# Patient Record
Sex: MALE | Race: WHITE | NOT HISPANIC OR LATINO | URBAN - METROPOLITAN AREA
[De-identification: names, ages, dates, MRNs, and addresses within clinical notes are randomized per-mention and may not be internally consistent; named-entity substitution may affect disease eponyms.]

---

## 2017-11-06 ENCOUNTER — INPATIENT (INPATIENT)
Facility: HOSPITAL | Age: 27
LOS: 0 days | Discharge: HOME | DRG: 728 | End: 2017-11-07
Attending: UROLOGY | Admitting: UROLOGY
Payer: SELF-PAY

## 2017-11-06 VITALS
RESPIRATION RATE: 18 BRPM | SYSTOLIC BLOOD PRESSURE: 120 MMHG | DIASTOLIC BLOOD PRESSURE: 77 MMHG | HEART RATE: 100 BPM | OXYGEN SATURATION: 99 % | TEMPERATURE: 98 F

## 2017-11-06 LAB
ALBUMIN SERPL ELPH-MCNC: 3.5 G/DL — SIGNIFICANT CHANGE UP (ref 3.4–5)
ALP SERPL-CCNC: 109 U/L — SIGNIFICANT CHANGE UP (ref 40–120)
ALT FLD-CCNC: 28 U/L — SIGNIFICANT CHANGE UP (ref 12–42)
ANION GAP SERPL CALC-SCNC: 9 MMOL/L — SIGNIFICANT CHANGE UP (ref 9–16)
APPEARANCE UR: CLEAR — SIGNIFICANT CHANGE UP
AST SERPL-CCNC: 15 U/L — SIGNIFICANT CHANGE UP (ref 15–37)
BILIRUB SERPL-MCNC: 1 MG/DL — SIGNIFICANT CHANGE UP (ref 0.2–1.2)
BILIRUB UR-MCNC: NEGATIVE — SIGNIFICANT CHANGE UP
BUN SERPL-MCNC: 13 MG/DL — SIGNIFICANT CHANGE UP (ref 7–23)
CALCIUM SERPL-MCNC: 10 MG/DL — SIGNIFICANT CHANGE UP (ref 8.5–10.5)
CHLORIDE SERPL-SCNC: 97 MMOL/L — SIGNIFICANT CHANGE UP (ref 96–108)
CO2 SERPL-SCNC: 27 MMOL/L — SIGNIFICANT CHANGE UP (ref 22–31)
COLOR SPEC: YELLOW — SIGNIFICANT CHANGE UP
CREAT SERPL-MCNC: 0.83 MG/DL — SIGNIFICANT CHANGE UP (ref 0.5–1.3)
DIFF PNL FLD: (no result)
GLUCOSE SERPL-MCNC: 98 MG/DL — SIGNIFICANT CHANGE UP (ref 70–99)
GLUCOSE UR QL: NEGATIVE — SIGNIFICANT CHANGE UP
HCT VFR BLD CALC: 46 % — SIGNIFICANT CHANGE UP (ref 39–50)
HGB BLD-MCNC: 15.4 G/DL — SIGNIFICANT CHANGE UP (ref 13–17)
KETONES UR-MCNC: (no result) MG/DL
LACTATE SERPL-SCNC: 1 MMOL/L — SIGNIFICANT CHANGE UP (ref 0.4–2)
LEUKOCYTE ESTERASE UR-ACNC: NEGATIVE — SIGNIFICANT CHANGE UP
LYMPHOCYTES # BLD AUTO: 6 % — LOW (ref 13–44)
MCHC RBC-ENTMCNC: 30.5 PG — SIGNIFICANT CHANGE UP (ref 27–34)
MCHC RBC-ENTMCNC: 33.5 G/DL — SIGNIFICANT CHANGE UP (ref 32–36)
MCV RBC AUTO: 91.1 FL — SIGNIFICANT CHANGE UP (ref 80–100)
MONOCYTES NFR BLD AUTO: 12 % — SIGNIFICANT CHANGE UP (ref 2–14)
NEUTROPHILS NFR BLD AUTO: 74 % — SIGNIFICANT CHANGE UP (ref 43–77)
NITRITE UR-MCNC: NEGATIVE — SIGNIFICANT CHANGE UP
PH UR: 6 — SIGNIFICANT CHANGE UP (ref 5–8)
PLATELET # BLD AUTO: 265 K/UL — SIGNIFICANT CHANGE UP (ref 150–400)
POTASSIUM SERPL-MCNC: 3.8 MMOL/L — SIGNIFICANT CHANGE UP (ref 3.5–5.3)
POTASSIUM SERPL-SCNC: 3.8 MMOL/L — SIGNIFICANT CHANGE UP (ref 3.5–5.3)
PROT SERPL-MCNC: 8.7 G/DL — HIGH (ref 6.4–8.2)
PROT UR-MCNC: NEGATIVE MG/DL — SIGNIFICANT CHANGE UP
RBC # BLD: 5.05 M/UL — SIGNIFICANT CHANGE UP (ref 4.2–5.8)
RBC # FLD: 12.3 % — SIGNIFICANT CHANGE UP (ref 10.3–16.9)
SODIUM SERPL-SCNC: 133 MMOL/L — SIGNIFICANT CHANGE UP (ref 132–145)
SP GR SPEC: 1.01 — SIGNIFICANT CHANGE UP (ref 1–1.03)
UROBILINOGEN FLD QL: 0.2 E.U./DL — SIGNIFICANT CHANGE UP
WBC # BLD: 31.3 K/UL — HIGH (ref 3.8–10.5)
WBC # FLD AUTO: 31.3 K/UL — HIGH (ref 3.8–10.5)

## 2017-11-06 PROCEDURE — 99285 EMERGENCY DEPT VISIT HI MDM: CPT

## 2017-11-06 PROCEDURE — 76870 US EXAM SCROTUM: CPT | Mod: 26

## 2017-11-06 RX ORDER — CIPROFLOXACIN LACTATE 400MG/40ML
400 VIAL (ML) INTRAVENOUS EVERY 12 HOURS
Qty: 0 | Refills: 0 | Status: DISCONTINUED | OUTPATIENT
Start: 2017-11-06 | End: 2017-11-06

## 2017-11-06 RX ORDER — PIPERACILLIN AND TAZOBACTAM 4; .5 G/20ML; G/20ML
3.38 INJECTION, POWDER, LYOPHILIZED, FOR SOLUTION INTRAVENOUS ONCE
Qty: 0 | Refills: 0 | Status: COMPLETED | OUTPATIENT
Start: 2017-11-06 | End: 2017-11-06

## 2017-11-06 RX ORDER — OXYCODONE AND ACETAMINOPHEN 5; 325 MG/1; MG/1
2 TABLET ORAL EVERY 4 HOURS
Qty: 0 | Refills: 0 | Status: DISCONTINUED | OUTPATIENT
Start: 2017-11-06 | End: 2017-11-07

## 2017-11-06 RX ORDER — MORPHINE SULFATE 50 MG/1
4 CAPSULE, EXTENDED RELEASE ORAL EVERY 4 HOURS
Qty: 0 | Refills: 0 | Status: DISCONTINUED | OUTPATIENT
Start: 2017-11-06 | End: 2017-11-07

## 2017-11-06 RX ORDER — MORPHINE SULFATE 50 MG/1
4 CAPSULE, EXTENDED RELEASE ORAL ONCE
Qty: 0 | Refills: 0 | Status: DISCONTINUED | OUTPATIENT
Start: 2017-11-06 | End: 2017-11-06

## 2017-11-06 RX ORDER — PIPERACILLIN AND TAZOBACTAM 4; .5 G/20ML; G/20ML
3.38 INJECTION, POWDER, LYOPHILIZED, FOR SOLUTION INTRAVENOUS EVERY 6 HOURS
Qty: 0 | Refills: 0 | Status: DISCONTINUED | OUTPATIENT
Start: 2017-11-06 | End: 2017-11-07

## 2017-11-06 RX ORDER — SODIUM CHLORIDE 9 MG/ML
2000 INJECTION INTRAMUSCULAR; INTRAVENOUS; SUBCUTANEOUS ONCE
Qty: 0 | Refills: 0 | Status: COMPLETED | OUTPATIENT
Start: 2017-11-06 | End: 2017-11-06

## 2017-11-06 RX ORDER — IBUPROFEN 200 MG
400 TABLET ORAL EVERY 6 HOURS
Qty: 0 | Refills: 0 | Status: DISCONTINUED | OUTPATIENT
Start: 2017-11-06 | End: 2017-11-07

## 2017-11-06 RX ORDER — ACETAMINOPHEN 500 MG
650 TABLET ORAL EVERY 6 HOURS
Qty: 0 | Refills: 0 | Status: DISCONTINUED | OUTPATIENT
Start: 2017-11-06 | End: 2017-11-07

## 2017-11-06 RX ORDER — OXYCODONE AND ACETAMINOPHEN 5; 325 MG/1; MG/1
1 TABLET ORAL EVERY 4 HOURS
Qty: 0 | Refills: 0 | Status: DISCONTINUED | OUTPATIENT
Start: 2017-11-06 | End: 2017-11-07

## 2017-11-06 RX ORDER — CEFTRIAXONE 500 MG/1
1 INJECTION, POWDER, FOR SOLUTION INTRAMUSCULAR; INTRAVENOUS ONCE
Qty: 0 | Refills: 0 | Status: COMPLETED | OUTPATIENT
Start: 2017-11-06 | End: 2017-11-06

## 2017-11-06 RX ORDER — AZITHROMYCIN 500 MG/1
500 TABLET, FILM COATED ORAL ONCE
Qty: 0 | Refills: 0 | Status: COMPLETED | OUTPATIENT
Start: 2017-11-06 | End: 2017-11-06

## 2017-11-06 RX ADMIN — SODIUM CHLORIDE 1000 MILLILITER(S): 9 INJECTION INTRAMUSCULAR; INTRAVENOUS; SUBCUTANEOUS at 20:53

## 2017-11-06 RX ADMIN — Medication 200 MILLIGRAM(S): at 20:52

## 2017-11-06 RX ADMIN — MORPHINE SULFATE 4 MILLIGRAM(S): 50 CAPSULE, EXTENDED RELEASE ORAL at 17:23

## 2017-11-06 RX ADMIN — CEFTRIAXONE 100 GRAM(S): 500 INJECTION, POWDER, FOR SOLUTION INTRAMUSCULAR; INTRAVENOUS at 20:02

## 2017-11-06 RX ADMIN — MORPHINE SULFATE 4 MILLIGRAM(S): 50 CAPSULE, EXTENDED RELEASE ORAL at 19:53

## 2017-11-06 NOTE — H&P ADULT - HISTORY OF PRESENT ILLNESS
26 yo male with no significant PMHx was admitted for acute L epididymo orchitis. 28 yo male with no significant PMHx was admitted for acute L epididymo orchitis. Patient noted swelling and discomfort L testicle 2 days ago. No fever/chills. No penile d/c. No h/o STD's.

## 2017-11-06 NOTE — H&P ADULT - NSHPLABSRESULTS_GEN_ALL_CORE
15.4   31.3  )-----------( 265      ( 2017 18:17 )             46.0   11-    133  |  97  |  13  ----------------------------<  98  3.8   |  27  |  0.83    Ca    10.0      2017 18:17    TPro  8.7<H>  /  Alb  3.5  /  TBili  1.0  /  DBili  x   /  AST  15  /  ALT  28  /  AlkPhos  109  11-    Urinalysis Basic - ( 2017 18:17 )    Color: Yellow / Appearance: Clear / S.010 / pH: x  Gluc: x / Ketone: Trace mg/dL  / Bili: NEGATIVE / Urobili: 0.2 E.U./dL   Blood: x / Protein: NEGATIVE mg/dL / Nitrite: NEGATIVE   Leuk Esterase: NEGATIVE / RBC: < 5 /HPF / WBC < 5 /HPF   Sq Epi: x / Non Sq Epi: x / Bacteria: Present /HPF    < from: US Testicles (17 @ 18:35) >    MPRESSION: Acute left epididymo-orchitis.      < end of copied text >

## 2017-11-06 NOTE — ED PROVIDER NOTE - ABDOMINAL EXAM
supra-pubic pain, pelvic lymphadenopathy supra-pubic pain, (+) enlarged L testicle > R testicle, mild erythema (+) palpable hydrocele, L testicle tender to palpation, no scrotal swelling or induration, testicle has normal lie, normal cremasteric reflex, (+) pelvic lymphadenopathy

## 2017-11-06 NOTE — ED PROVIDER NOTE - PROGRESS NOTE DETAILS
JW Pain controlled with morphine.  PT has orchitis with pyocele.  PT TX with IV antibiotics for possible GC/chlam infection and E coli infection.  Urology consult to discuss transfer.  Treat symptomatically and reassess. JW Discussed with urology Pt accepted as direct transfer. FINA Discussed with urology Pt accepted as a transfer for direct admission to Dr. Moses.  Discussed with urology resident and transfer center.  Pt treated with antibiotics and comfortable on morphine.  At time of transfer Pt hemodynamically stable in NAD.  Lactate of 1.

## 2017-11-06 NOTE — ED PROVIDER NOTE - PHYSICAL EXAMINATION
JW Testicular exam performed with RN Myerson in room.  Left testicular edema, erythema, TTP.  Cremasteric reflex absent.

## 2017-11-06 NOTE — H&P ADULT - NSHPPHYSICALEXAM_GEN_ALL_CORE
Gen : alert and awake  Abd:soft,NT,ND  : circumcised, no penile d/c, bilat testes descended, + swelling and discomfort L testicle.

## 2017-11-06 NOTE — ED PROVIDER NOTE - OBJECTIVE STATEMENT
27 YOM no PMH p/w 2 days of left testicular swelling.  Sx sudden onset at rest worsening over the past two days.  Associated pain, redness, and pelvic swelling.  No fevers, chills, sweats, weight loss, fatigue, or malaise.  No discharge, dysuria, other Sx. 4 sexual partners in the past year, protection some of the time.  No trauma.  No history of this in the past.

## 2017-11-06 NOTE — ED PROVIDER NOTE - ATTENDING CONTRIBUTION TO CARE
27 y.o. male with L epididymo-orchitis, with US showing hydrocele/pyocele, mild scrotal erythema and inguinal lymphadenopathy.  IV abx started in the ER as per urology, accepted initially for ER - ER transfer, then urology accepted for admission.

## 2017-11-06 NOTE — ED PROVIDER NOTE - MEDICAL DECISION MAKING DETAILS
27 YOM no PMH p/w 2 days of left testicular swelling.  Tachycardia, no fever.  DDX torsion vs infection.  R/O torsion, evaluate for infection, treat according to CDC recommendation.  Treat symptomatically for pain, reassess.

## 2017-11-07 VITALS
OXYGEN SATURATION: 98 % | SYSTOLIC BLOOD PRESSURE: 99 MMHG | DIASTOLIC BLOOD PRESSURE: 67 MMHG | HEART RATE: 75 BPM | RESPIRATION RATE: 16 BRPM | TEMPERATURE: 97 F

## 2017-11-07 DIAGNOSIS — N45.3 EPIDIDYMO-ORCHITIS: ICD-10-CM

## 2017-11-07 LAB
ANION GAP SERPL CALC-SCNC: 12 MMOL/L — SIGNIFICANT CHANGE UP (ref 5–17)
BUN SERPL-MCNC: 13 MG/DL — SIGNIFICANT CHANGE UP (ref 7–23)
CALCIUM SERPL-MCNC: 9.5 MG/DL — SIGNIFICANT CHANGE UP (ref 8.4–10.5)
CHLORIDE SERPL-SCNC: 98 MMOL/L — SIGNIFICANT CHANGE UP (ref 96–108)
CO2 SERPL-SCNC: 26 MMOL/L — SIGNIFICANT CHANGE UP (ref 22–31)
CREAT SERPL-MCNC: 0.67 MG/DL — SIGNIFICANT CHANGE UP (ref 0.5–1.3)
CULTURE RESULTS: NO GROWTH — SIGNIFICANT CHANGE UP
GLUCOSE SERPL-MCNC: 95 MG/DL — SIGNIFICANT CHANGE UP (ref 70–99)
HCT VFR BLD CALC: 39.3 % — SIGNIFICANT CHANGE UP (ref 39–50)
HCT VFR BLD CALC: 43.1 % — SIGNIFICANT CHANGE UP (ref 39–50)
HGB BLD-MCNC: 12.8 G/DL — LOW (ref 13–17)
HGB BLD-MCNC: 14.3 G/DL — SIGNIFICANT CHANGE UP (ref 13–17)
MAGNESIUM SERPL-MCNC: 2.3 MG/DL — SIGNIFICANT CHANGE UP (ref 1.6–2.6)
MCHC RBC-ENTMCNC: 29.8 PG — SIGNIFICANT CHANGE UP (ref 27–34)
MCHC RBC-ENTMCNC: 30.6 PG — SIGNIFICANT CHANGE UP (ref 27–34)
MCHC RBC-ENTMCNC: 32.6 G/DL — SIGNIFICANT CHANGE UP (ref 32–36)
MCHC RBC-ENTMCNC: 33.2 G/DL — SIGNIFICANT CHANGE UP (ref 32–36)
MCV RBC AUTO: 91.6 FL — SIGNIFICANT CHANGE UP (ref 80–100)
MCV RBC AUTO: 92.1 FL — SIGNIFICANT CHANGE UP (ref 80–100)
PHOSPHATE SERPL-MCNC: 2.6 MG/DL — SIGNIFICANT CHANGE UP (ref 2.5–4.5)
PLATELET # BLD AUTO: 238 K/UL — SIGNIFICANT CHANGE UP (ref 150–400)
PLATELET # BLD AUTO: 247 K/UL — SIGNIFICANT CHANGE UP (ref 150–400)
POTASSIUM SERPL-MCNC: 4.3 MMOL/L — SIGNIFICANT CHANGE UP (ref 3.5–5.3)
POTASSIUM SERPL-SCNC: 4.3 MMOL/L — SIGNIFICANT CHANGE UP (ref 3.5–5.3)
RBC # BLD: 4.29 M/UL — SIGNIFICANT CHANGE UP (ref 4.2–5.8)
RBC # BLD: 4.68 M/UL — SIGNIFICANT CHANGE UP (ref 4.2–5.8)
RBC # FLD: 12.9 % — SIGNIFICANT CHANGE UP (ref 10.3–16.9)
RBC # FLD: 13 % — SIGNIFICANT CHANGE UP (ref 10.3–16.9)
SODIUM SERPL-SCNC: 136 MMOL/L — SIGNIFICANT CHANGE UP (ref 135–145)
SPECIMEN SOURCE: SIGNIFICANT CHANGE UP
WBC # BLD: 17.8 K/UL — HIGH (ref 3.8–10.5)
WBC # BLD: 24.4 K/UL — HIGH (ref 3.8–10.5)
WBC # FLD AUTO: 17.8 K/UL — HIGH (ref 3.8–10.5)
WBC # FLD AUTO: 24.4 K/UL — HIGH (ref 3.8–10.5)

## 2017-11-07 PROCEDURE — 83735 ASSAY OF MAGNESIUM: CPT

## 2017-11-07 PROCEDURE — 85027 COMPLETE CBC AUTOMATED: CPT

## 2017-11-07 PROCEDURE — 81001 URINALYSIS AUTO W/SCOPE: CPT

## 2017-11-07 PROCEDURE — 96375 TX/PRO/DX INJ NEW DRUG ADDON: CPT

## 2017-11-07 PROCEDURE — 80048 BASIC METABOLIC PNL TOTAL CA: CPT

## 2017-11-07 PROCEDURE — 99285 EMERGENCY DEPT VISIT HI MDM: CPT | Mod: 25

## 2017-11-07 PROCEDURE — 84100 ASSAY OF PHOSPHORUS: CPT

## 2017-11-07 PROCEDURE — 80053 COMPREHEN METABOLIC PANEL: CPT

## 2017-11-07 PROCEDURE — 96374 THER/PROPH/DIAG INJ IV PUSH: CPT

## 2017-11-07 PROCEDURE — 83605 ASSAY OF LACTIC ACID: CPT

## 2017-11-07 PROCEDURE — 85025 COMPLETE CBC W/AUTO DIFF WBC: CPT

## 2017-11-07 PROCEDURE — 87086 URINE CULTURE/COLONY COUNT: CPT

## 2017-11-07 PROCEDURE — 36415 COLL VENOUS BLD VENIPUNCTURE: CPT

## 2017-11-07 PROCEDURE — 76870 US EXAM SCROTUM: CPT

## 2017-11-07 PROCEDURE — 87040 BLOOD CULTURE FOR BACTERIA: CPT

## 2017-11-07 RX ORDER — AZTREONAM 2 G
1 VIAL (EA) INJECTION
Qty: 20 | Refills: 0 | OUTPATIENT
Start: 2017-11-07 | End: 2017-11-17

## 2017-11-07 RX ORDER — INFLUENZA VIRUS VACCINE 15; 15; 15; 15 UG/.5ML; UG/.5ML; UG/.5ML; UG/.5ML
0.5 SUSPENSION INTRAMUSCULAR ONCE
Qty: 0 | Refills: 0 | Status: DISCONTINUED | OUTPATIENT
Start: 2017-11-07 | End: 2017-11-07

## 2017-11-07 RX ADMIN — Medication 400 MILLIGRAM(S): at 01:24

## 2017-11-07 RX ADMIN — OXYCODONE AND ACETAMINOPHEN 1 TABLET(S): 5; 325 TABLET ORAL at 09:40

## 2017-11-07 RX ADMIN — Medication 400 MILLIGRAM(S): at 06:05

## 2017-11-07 RX ADMIN — Medication 400 MILLIGRAM(S): at 11:30

## 2017-11-07 RX ADMIN — PIPERACILLIN AND TAZOBACTAM 200 GRAM(S): 4; .5 INJECTION, POWDER, LYOPHILIZED, FOR SOLUTION INTRAVENOUS at 11:30

## 2017-11-07 RX ADMIN — OXYCODONE AND ACETAMINOPHEN 2 TABLET(S): 5; 325 TABLET ORAL at 00:21

## 2017-11-07 RX ADMIN — OXYCODONE AND ACETAMINOPHEN 1 TABLET(S): 5; 325 TABLET ORAL at 08:48

## 2017-11-07 RX ADMIN — Medication 400 MILLIGRAM(S): at 12:30

## 2017-11-07 RX ADMIN — PIPERACILLIN AND TAZOBACTAM 200 GRAM(S): 4; .5 INJECTION, POWDER, LYOPHILIZED, FOR SOLUTION INTRAVENOUS at 06:05

## 2017-11-07 RX ADMIN — Medication 400 MILLIGRAM(S): at 01:54

## 2017-11-07 RX ADMIN — PIPERACILLIN AND TAZOBACTAM 200 GRAM(S): 4; .5 INJECTION, POWDER, LYOPHILIZED, FOR SOLUTION INTRAVENOUS at 01:19

## 2017-11-07 RX ADMIN — Medication 400 MILLIGRAM(S): at 06:35

## 2017-11-07 RX ADMIN — OXYCODONE AND ACETAMINOPHEN 2 TABLET(S): 5; 325 TABLET ORAL at 01:00

## 2017-11-07 NOTE — PROGRESS NOTE ADULT - SUBJECTIVE AND OBJECTIVE BOX
INTERVAL HPI/OVERNIGHT EVENTS:  No acute events overnight. Admitted overnight.     VITALS:    T(F): 98.9 (17 @ 00:00), Max: 98.9 (17 @ 00:00)  HR: 77 (17 @ 00:00) (74 - 100)  BP: 102/63 (17 @ 00:00) (102/63 - 127/74)  RR: 16 (17 @ 00:00) (16 - 18)  SpO2: 99% (17 @ 00:00) (99% - 99%)  Wt(kg): --    I&O's Detail    2017 07:01  -  2017 05:24  --------------------------------------------------------  IN:    Solution: 100 mL  Total IN: 100 mL    OUT:  Total OUT: 0 mL    Total NET: 100 mL          MEDICATIONS:    ANTIBIOTICS:  piperacillin/tazobactam IVPB. 3.375 Gram(s) IV Intermittent every 6 hours      PAIN CONTROL:  acetaminophen   Tablet. 650 milliGRAM(s) Oral every 6 hours PRN  ibuprofen  Tablet 400 milliGRAM(s) Oral every 6 hours  morphine  - Injectable 4 milliGRAM(s) IV Push every 4 hours PRN  oxyCODONE    5 mG/acetaminophen 325 mG 1 Tablet(s) Oral every 4 hours PRN  oxyCODONE    5 mG/acetaminophen 325 mG 2 Tablet(s) Oral every 4 hours PRN       MEDS:      HEME/ONC        PHYSICAL EXAM:  General: No acute distress.  Alert and Oriented  Abdominal Exam: soft, NT, ND   Exam: +L scrotal/testicular swelling and discomfort, BRP      LABS:                        15.4   31.3  )-----------( 265      ( 2017 18:17 )             46.0         133  |  97  |  13  ----------------------------<  98  3.8   |  27  |  0.83    Ca    10.0      2017 18:17    TPro  8.7<H>  /  Alb  3.5  /  TBili  1.0  /  DBili  x   /  AST  15  /  ALT  28  /  AlkPhos  109  11-06      Urinalysis Basic - ( 2017 18:17 )    Color: Yellow / Appearance: Clear / S.010 / pH: x  Gluc: x / Ketone: Trace mg/dL  / Bili: NEGATIVE / Urobili: 0.2 E.U./dL   Blood: x / Protein: NEGATIVE mg/dL / Nitrite: NEGATIVE   Leuk Esterase: NEGATIVE / RBC: < 5 /HPF / WBC < 5 /HPF   Sq Epi: x / Non Sq Epi: x / Bacteria: Present /HPF        RADIOLOGY & ADDITIONAL TESTS:

## 2017-11-07 NOTE — DISCHARGE NOTE ADULT - PLAN OF CARE
ambulation, hydration regular diet, activity as tolerated. Keep scrotum elevated with scrotal support. Take OTC motrin/ibuprofen for pain and swelling. If fever >100.4 or any change or worsening of symptoms please call doctor or report to ED. Make followup appointment at [facility] clinic call for appointment [phone]

## 2017-11-07 NOTE — DISCHARGE NOTE ADULT - HOSPITAL COURSE
27M no PMH came to [facility] with left epididymo-orchitis and a pyocele. Pt started on zosyn; given ceftriaxone x1 and Zithromax x1. Cultures drawn. AVSS, afebrile and hemodynamically stable

## 2017-11-07 NOTE — DISCHARGE NOTE ADULT - MEDICATION SUMMARY - MEDICATIONS TO TAKE
I will START or STAY ON the medications listed below when I get home from the hospital:    Bactrim  mg-160 mg oral tablet  -- 1 tab(s) by mouth 2 times a day   -- Avoid prolonged or excessive exposure to direct and/or artificial sunlight while taking this medication.  Finish all this medication unless otherwise directed by prescriber.  Medication should be taken with plenty of water.    -- Indication: For infection prophylaxis

## 2017-11-07 NOTE — DISCHARGE NOTE ADULT - CARE PLAN
Principal Discharge DX:	Epididymo-orchitis, acute  Goal:	ambulation, hydration  Instructions for follow-up, activity and diet:	regular diet, activity as tolerated. Keep scrotum elevated with scrotal support. Take OTC motrin/ibuprofen for pain and swelling. If fever >100.4 or any change or worsening of symptoms please call doctor or report to ED. Make followup appointment at [facility] clinic call for appointment [phone]

## 2017-11-07 NOTE — DISCHARGE NOTE ADULT - PATIENT PORTAL LINK FT
“You can access the [facility] Patient Portal, offered by [facility], by registering with the following website: [email or web address]”

## 2017-11-07 NOTE — DISCHARGE NOTE ADULT - CARE PROVIDER_API CALL
Ross Anderson (MD), Urology  [address], [place] [facility] [place][note 2]  Phone: [phone]  Fax: [phone]

## 2017-11-08 LAB
C TRACH RRNA SPEC QL NAA+PROBE: SIGNIFICANT CHANGE UP
N GONORRHOEA RRNA SPEC QL NAA+PROBE: DETECTED
SPECIMEN SOURCE: SIGNIFICANT CHANGE UP

## 2017-11-09 DIAGNOSIS — N45.3 EPIDIDYMO-ORCHITIS: ICD-10-CM

## 2017-11-09 DIAGNOSIS — R59.0 LOCALIZED ENLARGED LYMPH NODES: ICD-10-CM

## 2017-11-09 DIAGNOSIS — N34.0 URETHRAL ABSCESS: ICD-10-CM

## 2017-11-09 DIAGNOSIS — N50.819 TESTICULAR PAIN, UNSPECIFIED: ICD-10-CM

## 2017-11-09 DIAGNOSIS — N43.3 HYDROCELE, UNSPECIFIED: ICD-10-CM

## 2017-11-12 LAB
CULTURE RESULTS: SIGNIFICANT CHANGE UP
CULTURE RESULTS: SIGNIFICANT CHANGE UP
SPECIMEN SOURCE: SIGNIFICANT CHANGE UP
SPECIMEN SOURCE: SIGNIFICANT CHANGE UP

## 2018-01-24 NOTE — ED PROVIDER NOTE - HEAD, MLM
All normal, but low end for b12 and thiamine.  Will send letter.    Latest known visit with results is:   Lab Visit on 12/19/2017   Component Date Value Ref Range Status    TSH 12/19/2017 1.423  0.400 - 4.000 uIU/mL Final    Vitamin B-12 12/19/2017 355  210 - 950 pg/mL Final    Thiamine 12/19/2017 40  38 - 122 ug/L Final        Head is atraumatic. Head shape is symmetrical.

## 2018-08-14 ENCOUNTER — EMERGENCY (EMERGENCY)
Facility: HOSPITAL | Age: 28
LOS: 1 days | Discharge: HOME | End: 2018-08-14
Admitting: EMERGENCY MEDICINE
Payer: SELF-PAY

## 2018-08-14 VITALS
DIASTOLIC BLOOD PRESSURE: 80 MMHG | HEART RATE: 84 BPM | TEMPERATURE: 98 F | RESPIRATION RATE: 18 BRPM | OXYGEN SATURATION: 100 % | SYSTOLIC BLOOD PRESSURE: 119 MMHG

## 2018-08-14 PROCEDURE — 99053 MED SERV 10PM-8AM 24 HR FAC: CPT

## 2018-08-14 PROCEDURE — 64400 NJX AA&/STRD TRIGEMINAL NRV: CPT

## 2018-08-14 PROCEDURE — 99283 EMERGENCY DEPT VISIT LOW MDM: CPT | Mod: 25

## 2018-08-14 RX ORDER — PENICILLIN V POTASSIUM 250 MG
1 TABLET ORAL
Qty: 28 | Refills: 0 | OUTPATIENT
Start: 2018-08-14 | End: 2018-08-20

## 2018-08-14 RX ORDER — OXYCODONE AND ACETAMINOPHEN 5; 325 MG/1; MG/1
1 TABLET ORAL ONCE
Qty: 0 | Refills: 0 | Status: DISCONTINUED | OUTPATIENT
Start: 2018-08-14 | End: 2018-08-14

## 2018-08-14 RX ADMIN — OXYCODONE AND ACETAMINOPHEN 1 TABLET(S): 5; 325 TABLET ORAL at 03:01

## 2018-08-14 NOTE — ED PROVIDER NOTE - MEDICAL DECISION MAKING DETAILS
AFVSS at time of d/c, pt non-toxic appearing, results, ddx, and f/u plans discussed with pt at bedside, d/c'd home to f/u with PMD, strict return precautions discussed, prompt return to ER for any worsening or new sx, pt verbalized understanding. pt s/p tooth extraction yesterday, wasn't prescribed any analgesic by dentist, woke up with worsening pain today, no associated fever or bleeding or trauma, dental block performed with adequate improvement in sx, f/u plans discussed with pt at bedside, d/c'd home to f/u with dentist, avoid hot/solid food for now, strict return precautions discussed, prompt return to ER for any worsening or new sx, pt verbalized understanding.

## 2018-08-14 NOTE — ED PROVIDER NOTE - OBJECTIVE STATEMENT
29 yo M with no known PMHx, s/p L molar extraction x 2 yesterday, presenting c/o worsening pain in the tooth tonight. Pt reports feeling alright after the dental extraction, was not prescribed any meds by dentist subsequently and woke up with intense pain at the extraction site.  Noted mild oozing of blood but no heavy bleeding noted.  Denies fever, chills, worsening swelling, redness, rash, HA, dizziness, numbness, tingling, focal weakness, neck pain, CP, SOB,

## 2018-08-14 NOTE — ED PROCEDURE NOTE - PROCEDURE ADDITIONAL DETAILS
supraperiosteal infiltrate and greater palatine block with 3cc of marcaine, no bleeding noted subsequently

## 2018-08-14 NOTE — ED PROCEDURE NOTE - CPROC ED POST PROC CARE GUIDE1
Verbal/written post procedure instructions were given to patient/caregiver./Instructed patient/caregiver to follow-up with primary dentist./Avoid hot food./Avoid solid food./Instructed patient/caregiver regarding signs and symptoms of infection.

## 2018-08-14 NOTE — ED PROVIDER NOTE - PHYSICAL EXAMINATION
Gen - WDWN, NAD, comfortable and non-toxic appearing  Skin - warm, dry, intact   HEENT - AT/NC, s/p L upper molar extraction (tooth 16 and 17) with small clots, no active bleeding, minimal surrounding gingival edema, no fluctuance, or dc, airway patent, TM intact with no mastoid tenderness, neck supple   CV - S1S2, R/R/R  Resp - CTAB, no r/r/w  GI - soft, ND, NT, no CVAT b/l   MS - w/w/p, no c/c/e  Neuro - AxOx3, ambulatory without gait disturbance

## 2018-08-14 NOTE — ED ADULT NURSE NOTE - CHIEF COMPLAINT QUOTE
pt presents with sudden onset severe pain to the left side of the mouth, states that he had two teeth pulled yesterday afternoon.

## 2018-08-14 NOTE — ED ADULT NURSE NOTE - OBJECTIVE STATEMENT
pt medicated for pain, verbalizes readiness to discharge home, refusing EKG prior to discharge. states that he will follow up with dentist.

## 2018-08-18 DIAGNOSIS — K08.89 OTHER SPECIFIED DISORDERS OF TEETH AND SUPPORTING STRUCTURES: ICD-10-CM

## 2018-08-18 DIAGNOSIS — Z79.2 LONG TERM (CURRENT) USE OF ANTIBIOTICS: ICD-10-CM

## 2018-12-06 NOTE — ED ADULT TRIAGE NOTE - BP NONINVASIVE DIASTOLIC (MM HG)
Telephone Encounter by Marisela Yee NCMA at 11/29/17 02:43 PM     Author:  Marisela Yee NCMA Service:  (none) Author Type:  Certified Medical Assistant     Filed:  11/29/17 02:43 PM Encounter Date:  11/28/2017 Status:  Signed     :  Marisela Yee NCMA (Certified Medical Assistant)       From: Lucas Santiago  To: Lucas Fontaine PA  Sent: 11/28/2017  6:39 PM CST  Subject: Medication Questions    I don't know how to reply to message sent. But yes send my meds refill to   Dr. Barksdale.  My insurance has requested a refill order.  Thanks   Gilberto Santiago       Revision History        Date/Time User Provider Type Action    > 11/29/17 02:43 PM Marisela Yee NCMA Certified Medical Assistant Sign    Attribution information within the note text is not available.             80

## 2021-08-21 NOTE — ED ADULT TRIAGE NOTE - CHIEF COMPLAINT QUOTE
pt presents with sudden onset severe pain to the left side of the mouth, states that he had two teeth pulled yesterday afternoon. Spine appears normal, range of motion is not limited, no muscle or joint tenderness
